# Patient Record
Sex: FEMALE | Race: WHITE | URBAN - METROPOLITAN AREA
[De-identification: names, ages, dates, MRNs, and addresses within clinical notes are randomized per-mention and may not be internally consistent; named-entity substitution may affect disease eponyms.]

---

## 2017-01-10 ENCOUNTER — APPOINTMENT (RX ONLY)
Dept: URBAN - METROPOLITAN AREA CLINIC 348 | Facility: CLINIC | Age: 82
Setting detail: DERMATOLOGY
End: 2017-01-10

## 2017-01-10 DIAGNOSIS — D485 NEOPLASM OF UNCERTAIN BEHAVIOR OF SKIN: ICD-10-CM

## 2017-01-10 PROBLEM — L85.3 XEROSIS CUTIS: Status: ACTIVE | Noted: 2017-01-10

## 2017-01-10 PROBLEM — D48.5 NEOPLASM OF UNCERTAIN BEHAVIOR OF SKIN: Status: ACTIVE | Noted: 2017-01-10

## 2017-01-10 PROCEDURE — 11300 SHAVE SKIN LESION 0.5 CM/<: CPT

## 2017-01-10 PROCEDURE — ? COUNSELING

## 2017-01-10 PROCEDURE — ? SHAVE REMOVAL

## 2017-01-10 ASSESSMENT — LOCATION DETAILED DESCRIPTION DERM: LOCATION DETAILED: RIGHT PROXIMAL DORSAL FOREARM

## 2017-01-10 ASSESSMENT — LOCATION ZONE DERM: LOCATION ZONE: ARM

## 2017-01-10 ASSESSMENT — LOCATION SIMPLE DESCRIPTION DERM: LOCATION SIMPLE: RIGHT FOREARM

## 2017-01-10 NOTE — PROCEDURE: SHAVE REMOVAL
Medical Necessity Clause: This procedure was medically necessary because the lesion that was treated was:
Size Of Margin In Cm (Margins Are Not Added To Billing Dimensions): 0
Wound Care: Aquaphor
Bill 62318 For Specimen Handling/Conveyance To Laboratory?: no
Path Notes (To The Dermatopathologist): Please check margins.
Biopsy Method: Dermablade
Notification Instructions: Pt understands to return to office in 2-4 weeks for biopsy results
Consent was obtained from the patient. The risks and benefits to therapy were discussed in detail. Specifically, the risks of infection, scarring, bleeding, prolonged wound healing, incomplete removal, allergy to anesthesia, nerve injury and recurrence were addressed. Prior to the procedure, the treatment site was clearly identified and confirmed by the patient. All components of Universal Protocol/PAUSE Rule completed.
Detail Level: Detailed
Anesthesia Type: 1% lidocaine without epinephrine
Billing Type: Third-Party Bill
Post-Care Instructions: I reviewed with the patient in detail post-care instructions. Patient is to keep the biopsy site dry overnight, and then apply aquaphor twice daily until healed. Patient may apply hydrogen peroxide soaks to remove any crusting.
Hemostasis: Drysol
Medical Necessity Information: It is in your best interest to select a reason for this procedure from the list below. All of these items fulfill various CMS LCD requirements except the new and changing color options.

## 2017-01-31 ENCOUNTER — APPOINTMENT (RX ONLY)
Dept: URBAN - METROPOLITAN AREA CLINIC 348 | Facility: CLINIC | Age: 82
Setting detail: DERMATOLOGY
End: 2017-01-31

## 2017-01-31 DIAGNOSIS — L57.0 ACTINIC KERATOSIS: ICD-10-CM | Status: RESOLVING

## 2017-01-31 PROBLEM — L85.3 XEROSIS CUTIS: Status: ACTIVE | Noted: 2017-01-31

## 2017-01-31 PROCEDURE — 17000 DESTRUCT PREMALG LESION: CPT

## 2017-01-31 PROCEDURE — ? LIQUID NITROGEN

## 2017-01-31 PROCEDURE — ? COUNSELING

## 2017-01-31 ASSESSMENT — LOCATION ZONE DERM: LOCATION ZONE: ARM

## 2017-01-31 ASSESSMENT — LOCATION DETAILED DESCRIPTION DERM: LOCATION DETAILED: RIGHT PROXIMAL DORSAL FOREARM

## 2017-01-31 ASSESSMENT — LOCATION SIMPLE DESCRIPTION DERM: LOCATION SIMPLE: RIGHT FOREARM

## 2017-04-11 ENCOUNTER — IMPORTED ENCOUNTER (OUTPATIENT)
Dept: URBAN - METROPOLITAN AREA CLINIC 59 | Facility: CLINIC | Age: 82
End: 2017-04-11

## 2017-04-11 PROBLEM — H25.13 BILATERAL NUCLEAR SCLEROSIS CATARACTS: Noted: 2017-04-11

## 2017-04-11 PROBLEM — H43.813 VITREOUS DEGENERATION, BILATERAL: Noted: 2017-04-11

## 2017-04-11 PROBLEM — H35.3132 NONEXUDATIVE AGE-RELATED MACULAR DEGENERATION, BILATERAL, INTERMEDIATE DRY STAGE: Noted: 2017-04-11

## 2017-04-11 PROCEDURE — 92134 CPTRZ OPH DX IMG PST SGM RTA: CPT

## 2017-04-11 PROCEDURE — 92014 COMPRE OPH EXAM EST PT 1/>: CPT

## 2017-04-18 ENCOUNTER — IMPORTED ENCOUNTER (OUTPATIENT)
Dept: URBAN - METROPOLITAN AREA CLINIC 59 | Facility: CLINIC | Age: 82
End: 2017-04-18

## 2017-04-18 PROBLEM — H25.11 NUCLEAR SCLEROSIS CATARACT OF RT EYE: Noted: 2017-04-18

## 2017-04-18 PROBLEM — H35.3132 NONEXUDATIVE AGE-RELATED MACULAR DEGENERATION, BILATERAL, INTERMEDIATE DRY STAGE: Noted: 2017-04-18

## 2017-04-18 PROBLEM — H43.813 VITREOUS DEGENERATION, BILATERAL: Noted: 2017-04-18

## 2017-04-18 PROBLEM — H25.12 NUCLEAR SCLEROSIS CATARACT OF LT EYE: Noted: 2017-04-18

## 2017-04-18 PROCEDURE — 99213 OFFICE O/P EST LOW 20 MIN: CPT

## 2017-04-18 PROCEDURE — 92136 OPHTHALMIC BIOMETRY: CPT

## 2017-04-18 PROCEDURE — KIT COMPUTER ORTHOPTICS KIT

## 2017-04-27 ENCOUNTER — IMPORTED ENCOUNTER (OUTPATIENT)
Dept: URBAN - METROPOLITAN AREA CLINIC 59 | Facility: CLINIC | Age: 82
End: 2017-04-27

## 2017-04-27 PROCEDURE — 66984 XCAPSL CTRC RMVL W/O ECP: CPT

## 2017-04-28 ENCOUNTER — IMPORTED ENCOUNTER (OUTPATIENT)
Dept: URBAN - METROPOLITAN AREA CLINIC 59 | Facility: CLINIC | Age: 82
End: 2017-04-28

## 2017-04-28 PROBLEM — Z96.1 PRESENCE OF PSEUDOPHAKIA: Noted: 2017-04-28

## 2017-04-28 PROCEDURE — 99024 POSTOP FOLLOW-UP VISIT: CPT

## 2017-05-04 ENCOUNTER — IMPORTED ENCOUNTER (OUTPATIENT)
Dept: URBAN - METROPOLITAN AREA CLINIC 59 | Facility: CLINIC | Age: 82
End: 2017-05-04

## 2017-05-04 PROCEDURE — 66984 XCAPSL CTRC RMVL W/O ECP: CPT

## 2017-05-05 ENCOUNTER — IMPORTED ENCOUNTER (OUTPATIENT)
Dept: URBAN - METROPOLITAN AREA CLINIC 59 | Facility: CLINIC | Age: 82
End: 2017-05-05

## 2017-05-05 PROCEDURE — 99024 POSTOP FOLLOW-UP VISIT: CPT

## 2017-05-09 ENCOUNTER — APPOINTMENT (RX ONLY)
Dept: URBAN - METROPOLITAN AREA CLINIC 348 | Facility: CLINIC | Age: 82
Setting detail: DERMATOLOGY
End: 2017-05-09

## 2017-05-09 DIAGNOSIS — D22 MELANOCYTIC NEVI: ICD-10-CM

## 2017-05-09 DIAGNOSIS — I83.9 ASYMPTOMATIC VARICOSE VEINS OF LOWER EXTREMITIES: ICD-10-CM

## 2017-05-09 DIAGNOSIS — Z85.828 PERSONAL HISTORY OF OTHER MALIGNANT NEOPLASM OF SKIN: ICD-10-CM

## 2017-05-09 DIAGNOSIS — D18.0 HEMANGIOMA: ICD-10-CM

## 2017-05-09 DIAGNOSIS — L81.4 OTHER MELANIN HYPERPIGMENTATION: ICD-10-CM

## 2017-05-09 DIAGNOSIS — L82.1 OTHER SEBORRHEIC KERATOSIS: ICD-10-CM

## 2017-05-09 PROBLEM — L55.1 SUNBURN OF SECOND DEGREE: Status: ACTIVE | Noted: 2017-05-09

## 2017-05-09 PROBLEM — D22.5 MELANOCYTIC NEVI OF TRUNK: Status: ACTIVE | Noted: 2017-05-09

## 2017-05-09 PROBLEM — D18.01 HEMANGIOMA OF SKIN AND SUBCUTANEOUS TISSUE: Status: ACTIVE | Noted: 2017-05-09

## 2017-05-09 PROBLEM — I83.93 ASYMPTOMATIC VARICOSE VEINS OF BILATERAL LOWER EXTREMITIES: Status: ACTIVE | Noted: 2017-05-09

## 2017-05-09 PROBLEM — L20.84 INTRINSIC (ALLERGIC) ECZEMA: Status: ACTIVE | Noted: 2017-05-09

## 2017-05-09 PROCEDURE — 99213 OFFICE O/P EST LOW 20 MIN: CPT

## 2017-05-09 PROCEDURE — ? OTHER

## 2017-05-09 PROCEDURE — ? COUNSELING

## 2017-05-09 ASSESSMENT — LOCATION DETAILED DESCRIPTION DERM
LOCATION DETAILED: LEFT SUPERIOR MEDIAL UPPER BACK
LOCATION DETAILED: RIGHT PROXIMAL PRETIBIAL REGION
LOCATION DETAILED: LEFT DISTAL PRETIBIAL REGION
LOCATION DETAILED: EPIGASTRIC SKIN
LOCATION DETAILED: SUPERIOR LUMBAR SPINE
LOCATION DETAILED: LEFT PROXIMAL PRETIBIAL REGION
LOCATION DETAILED: UPPER STERNUM

## 2017-05-09 ASSESSMENT — LOCATION SIMPLE DESCRIPTION DERM
LOCATION SIMPLE: LOWER BACK
LOCATION SIMPLE: LEFT UPPER BACK
LOCATION SIMPLE: ABDOMEN
LOCATION SIMPLE: CHEST
LOCATION SIMPLE: LEFT PRETIBIAL REGION
LOCATION SIMPLE: RIGHT PRETIBIAL REGION

## 2017-05-09 ASSESSMENT — LOCATION ZONE DERM
LOCATION ZONE: LEG
LOCATION ZONE: TRUNK

## 2017-05-09 NOTE — PROCEDURE: OTHER
Note Text (......Xxx Chief Complaint.): This diagnosis correlates with the
Other (Free Text): Patient unsure of type and when treated
Detail Level: Detailed

## 2017-05-16 ENCOUNTER — IMPORTED ENCOUNTER (OUTPATIENT)
Dept: URBAN - METROPOLITAN AREA CLINIC 59 | Facility: CLINIC | Age: 82
End: 2017-05-16

## 2017-05-16 PROBLEM — Z96.1 PRESENCE OF PSEUDOPHAKIA: Noted: 2017-05-16

## 2017-05-16 PROCEDURE — 99024 POSTOP FOLLOW-UP VISIT: CPT

## 2017-05-30 ENCOUNTER — IMPORTED ENCOUNTER (OUTPATIENT)
Dept: URBAN - METROPOLITAN AREA CLINIC 59 | Facility: CLINIC | Age: 82
End: 2017-05-30

## 2017-05-30 PROBLEM — H35.3132 NONEXUDATIVE AGE-RELATED MACULAR DEGENERATION, BILATERAL, INTERMEDIATE DRY STAGE: Noted: 2017-05-30

## 2017-05-30 PROBLEM — Z96.1 PRESENCE OF PSEUDOPHAKIA: Noted: 2017-05-30

## 2017-05-30 PROCEDURE — 99024 POSTOP FOLLOW-UP VISIT: CPT

## 2017-05-30 PROCEDURE — 92134 CPTRZ OPH DX IMG PST SGM RTA: CPT

## 2017-06-05 ENCOUNTER — IMPORTED ENCOUNTER (OUTPATIENT)
Dept: URBAN - METROPOLITAN AREA CLINIC 59 | Facility: CLINIC | Age: 82
End: 2017-06-05

## 2017-06-05 PROBLEM — Z96.1 PRESENCE OF PSEUDOPHAKIA: Noted: 2017-06-05

## 2017-06-05 PROBLEM — H35.81 RETINAL EDEMA: Noted: 2017-06-05

## 2017-06-05 PROBLEM — H43.813 VITREOUS DEGENERATION, BILATERAL: Noted: 2017-06-05

## 2017-06-05 PROBLEM — H35.3133 NONEXUDATIVE AGE-RELATED MACULAR DEGENERATION, BILATERAL, ADVANCED ATROPHIC WITHOUT SUBFOVEAL INVOLVEMENT: Noted: 2017-06-05

## 2017-06-05 PROCEDURE — 92134 CPTRZ OPH DX IMG PST SGM RTA: CPT

## 2017-06-05 PROCEDURE — 92014 COMPRE OPH EXAM EST PT 1/>: CPT

## 2017-06-05 PROCEDURE — 92235 FLUORESCEIN ANGRPH MLTIFRAME: CPT

## 2017-06-27 ENCOUNTER — IMPORTED ENCOUNTER (OUTPATIENT)
Dept: URBAN - METROPOLITAN AREA CLINIC 59 | Facility: CLINIC | Age: 82
End: 2017-06-27

## 2017-06-27 PROBLEM — Z96.1 PRESENCE OF PSEUDOPHAKIA: Noted: 2017-06-27

## 2017-06-27 PROCEDURE — 99024 POSTOP FOLLOW-UP VISIT: CPT

## 2017-07-17 ENCOUNTER — IMPORTED ENCOUNTER (OUTPATIENT)
Dept: URBAN - METROPOLITAN AREA CLINIC 59 | Facility: CLINIC | Age: 82
End: 2017-07-17

## 2017-07-17 PROBLEM — H43.813 VITREOUS DEGENERATION, BILATERAL: Noted: 2017-07-17

## 2017-07-17 PROBLEM — H35.3231 EXUDATIVE AGE-RELATED MACULAR DEGENERATION, BILATERAL, WITH ACTIVE CHOROIDAL NEOVASCULARIZATION: Noted: 2017-07-17

## 2017-07-17 PROCEDURE — 92225 OPHTHALMOSCOPY (INITIAL): CPT

## 2017-07-17 PROCEDURE — 92134 CPTRZ OPH DX IMG PST SGM RTA: CPT

## 2017-07-17 PROCEDURE — 92235 FLUORESCEIN ANGRPH MLTIFRAME: CPT

## 2017-07-17 PROCEDURE — 92004 COMPRE OPH EXAM NEW PT 1/>: CPT

## 2017-07-17 PROCEDURE — 67028 INJECTION EYE DRUG: CPT

## 2017-07-24 ENCOUNTER — IMPORTED ENCOUNTER (OUTPATIENT)
Dept: URBAN - METROPOLITAN AREA CLINIC 59 | Facility: CLINIC | Age: 82
End: 2017-07-24

## 2017-07-24 PROBLEM — H43.813 VITREOUS DEGENERATION, BILATERAL: Noted: 2017-07-24

## 2017-07-24 PROBLEM — H35.81 RETINAL EDEMA: Noted: 2017-07-24

## 2017-07-24 PROBLEM — H35.3231 EXUDATIVE AGE-RELATED MACULAR DEGENERATION, BILATERAL, WITH ACTIVE CHOROIDAL NEOVASCULARIZATION: Noted: 2017-07-24

## 2017-07-24 PROBLEM — Z96.1 PRESENCE OF PSEUDOPHAKIA: Noted: 2017-07-24

## 2017-07-24 PROCEDURE — 67028 INJECTION EYE DRUG: CPT

## 2017-08-23 ENCOUNTER — IMPORTED ENCOUNTER (OUTPATIENT)
Dept: URBAN - METROPOLITAN AREA CLINIC 59 | Facility: CLINIC | Age: 82
End: 2017-08-23

## 2017-08-23 PROBLEM — H43.813 VITREOUS DEGENERATION, BILATERAL: Noted: 2017-08-23

## 2017-08-23 PROBLEM — H35.3231 EXUDATIVE AGE-RELATED MACULAR DEGENERATION, BILATERAL, WITH ACTIVE CHOROIDAL NEOVASCULARIZATION: Noted: 2017-08-23

## 2017-08-23 PROCEDURE — 92134 CPTRZ OPH DX IMG PST SGM RTA: CPT

## 2017-08-23 PROCEDURE — 67028 INJECTION EYE DRUG: CPT

## 2017-09-27 ENCOUNTER — IMPORTED ENCOUNTER (OUTPATIENT)
Dept: URBAN - METROPOLITAN AREA CLINIC 59 | Facility: CLINIC | Age: 82
End: 2017-09-27

## 2017-09-27 PROBLEM — H43.813 VITREOUS DEGENERATION, BILATERAL: Noted: 2017-09-27

## 2017-09-27 PROBLEM — H35.3231 EXUDATIVE AGE-RELATED MACULAR DEGENERATION, BILATERAL, WITH ACTIVE CHOROIDAL NEOVASCULARIZATION: Noted: 2017-09-27

## 2017-09-27 PROCEDURE — 67028 INJECTION EYE DRUG: CPT

## 2017-09-27 PROCEDURE — 92235 FLUORESCEIN ANGRPH MLTIFRAME: CPT

## 2017-09-27 PROCEDURE — 92134 CPTRZ OPH DX IMG PST SGM RTA: CPT

## 2017-11-08 ENCOUNTER — IMPORTED ENCOUNTER (OUTPATIENT)
Dept: URBAN - METROPOLITAN AREA CLINIC 59 | Facility: CLINIC | Age: 82
End: 2017-11-08

## 2017-11-08 PROBLEM — H43.813 VITREOUS DEGENERATION, BILATERAL: Noted: 2017-11-08

## 2017-11-08 PROBLEM — H35.3231 EXUDATIVE AGE-RELATED MACULAR DEGENERATION, BILATERAL, WITH ACTIVE CHOROIDAL NEOVASCULARIZATION: Noted: 2017-11-08

## 2017-11-08 PROCEDURE — 67028 INJECTION EYE DRUG: CPT

## 2017-11-08 PROCEDURE — 92134 CPTRZ OPH DX IMG PST SGM RTA: CPT

## 2017-12-19 ENCOUNTER — IMPORTED ENCOUNTER (OUTPATIENT)
Dept: URBAN - METROPOLITAN AREA CLINIC 59 | Facility: CLINIC | Age: 82
End: 2017-12-19

## 2017-12-19 PROBLEM — H26.492 OTHER SECONDARY CATARACT, LEFT EYE: Noted: 2017-12-19

## 2017-12-19 PROBLEM — H35.3231 EXUDATIVE AGE-RELATED MACULAR DEGENERATION, BILATERAL, WITH ACTIVE CHOROIDAL NEOVASCULARIZATION: Noted: 2017-12-19

## 2017-12-19 PROBLEM — H26.491 PRESENCE OF INTRAOCULAR LENS: Noted: 2017-12-19

## 2017-12-19 PROBLEM — H43.813 VITREOUS DEGENERATION, BILATERAL: Noted: 2017-12-19

## 2017-12-19 PROBLEM — H40.013 OPEN ANGLE WITH BORDERLINE FINDINGS, LOW RISK, BILATERAL: Noted: 2017-12-19

## 2017-12-19 PROCEDURE — 92014 COMPRE OPH EXAM EST PT 1/>: CPT

## 2017-12-27 ENCOUNTER — IMPORTED ENCOUNTER (OUTPATIENT)
Dept: URBAN - METROPOLITAN AREA CLINIC 59 | Facility: CLINIC | Age: 82
End: 2017-12-27

## 2017-12-27 PROBLEM — H43.813 VITREOUS DEGENERATION, BILATERAL: Noted: 2017-12-27

## 2017-12-27 PROBLEM — H35.3231 EXUDATIVE AGE-RELATED MACULAR DEGENERATION, BILATERAL, WITH ACTIVE CHOROIDAL NEOVASCULARIZATION: Noted: 2017-12-27

## 2017-12-27 PROCEDURE — 92134 CPTRZ OPH DX IMG PST SGM RTA: CPT

## 2017-12-27 PROCEDURE — 67028 INJECTION EYE DRUG: CPT

## 2018-04-02 ENCOUNTER — IMPORTED ENCOUNTER (OUTPATIENT)
Dept: URBAN - METROPOLITAN AREA CLINIC 59 | Facility: CLINIC | Age: 83
End: 2018-04-02

## 2018-04-02 PROBLEM — H43.813 VITREOUS DEGENERATION, BILATERAL: Noted: 2018-04-02

## 2018-04-02 PROBLEM — H35.3231 EXUDATIVE AGE-RELATED MACULAR DEGENERATION, BILATERAL, WITH ACTIVE CHOROIDAL NEOVASCULARIZATION: Noted: 2018-04-02

## 2018-04-02 PROCEDURE — 92134 CPTRZ OPH DX IMG PST SGM RTA: CPT

## 2018-04-02 PROCEDURE — 92226 OPHTHALMOSCOPY (SUB): CPT

## 2018-04-02 PROCEDURE — 92014 COMPRE OPH EXAM EST PT 1/>: CPT

## 2018-04-02 PROCEDURE — 67028 INJECTION EYE DRUG: CPT

## 2018-05-02 ENCOUNTER — IMPORTED ENCOUNTER (OUTPATIENT)
Dept: URBAN - METROPOLITAN AREA CLINIC 59 | Facility: CLINIC | Age: 83
End: 2018-05-02

## 2018-05-02 PROBLEM — H43.813 VITREOUS DEGENERATION, BILATERAL: Noted: 2018-05-02

## 2018-05-02 PROBLEM — H35.3231 EXUDATIVE AGE-RELATED MACULAR DEGENERATION, BILATERAL, WITH ACTIVE CHOROIDAL NEOVASCULARIZATION: Noted: 2018-05-02

## 2018-05-02 PROCEDURE — 92235 FLUORESCEIN ANGRPH MLTIFRAME: CPT

## 2018-05-02 PROCEDURE — 92014 COMPRE OPH EXAM EST PT 1/>: CPT

## 2018-05-02 PROCEDURE — 92134 CPTRZ OPH DX IMG PST SGM RTA: CPT

## 2018-05-09 ENCOUNTER — IMPORTED ENCOUNTER (OUTPATIENT)
Dept: URBAN - METROPOLITAN AREA CLINIC 59 | Facility: CLINIC | Age: 83
End: 2018-05-09

## 2018-05-09 PROBLEM — H43.813 VITREOUS DEGENERATION, BILATERAL: Noted: 2018-05-09

## 2018-05-09 PROBLEM — H35.3231 EXUDATIVE AGE-RELATED MACULAR DEGENERATION, BILATERAL, WITH ACTIVE CHOROIDAL NEOVASCULARIZATION: Noted: 2018-05-09

## 2018-05-09 PROCEDURE — 67028 INJECTION EYE DRUG: CPT

## 2018-06-18 ENCOUNTER — IMPORTED ENCOUNTER (OUTPATIENT)
Dept: URBAN - METROPOLITAN AREA CLINIC 59 | Facility: CLINIC | Age: 83
End: 2018-06-18

## 2018-06-18 PROBLEM — H43.813 VITREOUS DEGENERATION, BILATERAL: Noted: 2018-06-18

## 2018-06-18 PROBLEM — H35.3231 EXUDATIVE AGE-RELATED MACULAR DEGENERATION, BILATERAL, WITH ACTIVE CHOROIDAL NEOVASCULARIZATION: Noted: 2018-06-18

## 2018-06-18 PROCEDURE — 92235 FLUORESCEIN ANGRPH MLTIFRAME: CPT

## 2018-06-18 PROCEDURE — 92014 COMPRE OPH EXAM EST PT 1/>: CPT

## 2018-06-18 PROCEDURE — 92250 FUNDUS PHOTOGRAPHY W/I&R: CPT

## 2018-07-16 ENCOUNTER — IMPORTED ENCOUNTER (OUTPATIENT)
Dept: URBAN - METROPOLITAN AREA CLINIC 59 | Facility: CLINIC | Age: 83
End: 2018-07-16

## 2018-07-16 PROBLEM — H43.813 VITREOUS DEGENERATION, BILATERAL: Noted: 2018-07-16

## 2018-07-16 PROBLEM — H35.3231 EXUDATIVE AGE-RELATED MACULAR DEGENERATION, BILATERAL, WITH ACTIVE CHOROIDAL NEOVASCULARIZATION: Noted: 2018-07-16

## 2018-07-16 PROCEDURE — 92134 CPTRZ OPH DX IMG PST SGM RTA: CPT

## 2018-07-16 PROCEDURE — 67028 INJECTION EYE DRUG: CPT

## 2018-07-16 PROCEDURE — 92014 COMPRE OPH EXAM EST PT 1/>: CPT

## 2018-08-22 ENCOUNTER — IMPORTED ENCOUNTER (OUTPATIENT)
Dept: URBAN - METROPOLITAN AREA CLINIC 59 | Facility: CLINIC | Age: 83
End: 2018-08-22

## 2018-08-22 PROBLEM — H35.3231 EXUDATIVE AGE-RELATED MACULAR DEGENERATION, BILATERAL, WITH ACTIVE CHOROIDAL NEOVASCULARIZATION: Noted: 2018-08-22

## 2018-08-22 PROBLEM — H43.813 VITREOUS DEGENERATION, BILATERAL: Noted: 2018-08-22

## 2018-08-22 PROCEDURE — 92014 COMPRE OPH EXAM EST PT 1/>: CPT

## 2018-08-22 PROCEDURE — 92134 CPTRZ OPH DX IMG PST SGM RTA: CPT

## 2018-08-22 PROCEDURE — 67028 INJECTION EYE DRUG: CPT

## 2018-10-03 ENCOUNTER — IMPORTED ENCOUNTER (OUTPATIENT)
Dept: URBAN - METROPOLITAN AREA CLINIC 59 | Facility: CLINIC | Age: 83
End: 2018-10-03

## 2018-10-03 PROBLEM — H43.813 VITREOUS DEGENERATION, BILATERAL: Noted: 2018-10-03

## 2018-10-03 PROBLEM — H35.3231 EXUDATIVE AGE-RELATED MACULAR DEGENERATION, BILATERAL, WITH ACTIVE CHOROIDAL NEOVASCULARIZATION: Noted: 2018-10-03

## 2018-10-03 PROCEDURE — 67028 INJECTION EYE DRUG: CPT

## 2018-10-03 PROCEDURE — 92014 COMPRE OPH EXAM EST PT 1/>: CPT

## 2018-10-03 PROCEDURE — 92134 CPTRZ OPH DX IMG PST SGM RTA: CPT

## 2018-11-06 ENCOUNTER — NEW REFERRAL (OUTPATIENT)
Age: 83
End: 2018-11-06

## 2018-11-06 DIAGNOSIS — H35.3222: ICD-10-CM

## 2018-11-06 DIAGNOSIS — H35.3211: ICD-10-CM

## 2018-11-06 DIAGNOSIS — H43.813: ICD-10-CM

## 2018-11-06 PROCEDURE — 99204 OFFICE O/P NEW MOD 45 MIN: CPT

## 2018-11-06 PROCEDURE — 92134 CPTRZ OPH DX IMG PST SGM RTA: CPT

## 2018-11-06 PROCEDURE — 67028 INJECTION EYE DRUG: CPT

## 2018-11-06 PROCEDURE — 92499 UNLISTED OPH SVC/PROCEDURE: CPT

## 2018-11-06 PROCEDURE — 92250 FUNDUS PHOTOGRAPHY W/I&R: CPT

## 2018-11-06 ASSESSMENT — VISUAL ACUITY
OS_CC: 20/30
OD_CC: 20/30-

## 2018-11-06 ASSESSMENT — TONOMETRY
OD_IOP_MMHG: 12
OS_IOP_MMHG: 12

## 2019-02-01 ENCOUNTER — FOLLOW UP (OUTPATIENT)
Age: 84
End: 2019-02-01

## 2019-02-01 DIAGNOSIS — H43.813: ICD-10-CM

## 2019-02-01 DIAGNOSIS — H35.3211: ICD-10-CM

## 2019-02-01 DIAGNOSIS — H35.3222: ICD-10-CM

## 2019-02-01 PROCEDURE — 92134 CPTRZ OPH DX IMG PST SGM RTA: CPT

## 2019-02-01 PROCEDURE — 67028 INJECTION EYE DRUG: CPT

## 2019-02-01 PROCEDURE — J0178S EYLEA SAMPLE

## 2019-02-01 PROCEDURE — 92014 COMPRE OPH EXAM EST PT 1/>: CPT | Mod: 25

## 2019-02-01 ASSESSMENT — VISUAL ACUITY
OS_SC: 20/60
OD_PH: 20/30-1
OS_PH: 20/30-1
OD_SC: 20/70

## 2019-02-01 ASSESSMENT — TONOMETRY
OS_IOP_MMHG: 11
OD_IOP_MMHG: 11

## 2019-04-05 ENCOUNTER — FOLLOW UP (OUTPATIENT)
Age: 84
End: 2019-04-05

## 2019-04-05 DIAGNOSIS — H43.813: ICD-10-CM

## 2019-04-05 DIAGNOSIS — H35.3222: ICD-10-CM

## 2019-04-05 DIAGNOSIS — H35.3211: ICD-10-CM

## 2019-04-05 PROCEDURE — 92014 COMPRE OPH EXAM EST PT 1/>: CPT | Mod: 25

## 2019-04-05 PROCEDURE — 92134 CPTRZ OPH DX IMG PST SGM RTA: CPT

## 2019-04-05 PROCEDURE — 67028 INJECTION EYE DRUG: CPT

## 2019-04-05 ASSESSMENT — VISUAL ACUITY
OD_CC: 20/20-2
OS_CC: 20/30+2

## 2019-04-05 ASSESSMENT — TONOMETRY
OS_IOP_MMHG: 11
OD_IOP_MMHG: 13

## 2019-06-07 ENCOUNTER — FOLLOW UP (OUTPATIENT)
Age: 84
End: 2019-06-07

## 2019-06-07 DIAGNOSIS — Z96.1: ICD-10-CM

## 2019-06-07 DIAGNOSIS — H35.3222: ICD-10-CM

## 2019-06-07 DIAGNOSIS — H43.813: ICD-10-CM

## 2019-06-07 DIAGNOSIS — H35.3211: ICD-10-CM

## 2019-06-07 PROCEDURE — 67028 INJECTION EYE DRUG: CPT

## 2019-06-07 PROCEDURE — 92014 COMPRE OPH EXAM EST PT 1/>: CPT | Mod: 25

## 2019-06-07 PROCEDURE — 92134 CPTRZ OPH DX IMG PST SGM RTA: CPT

## 2019-06-07 ASSESSMENT — VISUAL ACUITY
OD_CC: 20/20
OS_CC: 20/25+2

## 2019-06-07 ASSESSMENT — TONOMETRY
OD_IOP_MMHG: 10
OS_IOP_MMHG: 11

## 2019-08-16 ENCOUNTER — FOLLOW UP (OUTPATIENT)
Age: 84
End: 2019-08-16

## 2019-08-16 DIAGNOSIS — Z96.1: ICD-10-CM

## 2019-08-16 DIAGNOSIS — H35.3222: ICD-10-CM

## 2019-08-16 DIAGNOSIS — H43.813: ICD-10-CM

## 2019-08-16 DIAGNOSIS — H35.3211: ICD-10-CM

## 2019-08-16 PROCEDURE — 67028 INJECTION EYE DRUG: CPT

## 2019-08-16 PROCEDURE — 92134 CPTRZ OPH DX IMG PST SGM RTA: CPT

## 2019-08-16 PROCEDURE — 92014 COMPRE OPH EXAM EST PT 1/>: CPT | Mod: 25

## 2019-08-16 ASSESSMENT — VISUAL ACUITY
OD_CC: 20/20-1
OS_CC: 20/20-1

## 2019-08-16 ASSESSMENT — TONOMETRY
OS_IOP_MMHG: 21
OD_IOP_MMHG: 13

## 2019-10-29 ENCOUNTER — FOLLOW UP (OUTPATIENT)
Age: 84
End: 2019-10-29

## 2019-10-29 DIAGNOSIS — H43.813: ICD-10-CM

## 2019-10-29 DIAGNOSIS — H35.3222: ICD-10-CM

## 2019-10-29 DIAGNOSIS — H35.3211: ICD-10-CM

## 2019-10-29 PROCEDURE — 92014 COMPRE OPH EXAM EST PT 1/>: CPT | Mod: 25

## 2019-10-29 PROCEDURE — 92134 CPTRZ OPH DX IMG PST SGM RTA: CPT

## 2019-10-29 PROCEDURE — 67028 INJECTION EYE DRUG: CPT

## 2019-10-29 ASSESSMENT — VISUAL ACUITY
OD_CC: 20/20-2
OS_CC: 20/30

## 2019-10-29 ASSESSMENT — TONOMETRY
OS_IOP_MMHG: 11
OD_IOP_MMHG: 11

## 2020-01-10 ENCOUNTER — FOLLOW UP (OUTPATIENT)
Age: 85
End: 2020-01-10

## 2020-01-10 DIAGNOSIS — H43.813: ICD-10-CM

## 2020-01-10 DIAGNOSIS — H35.3211: ICD-10-CM

## 2020-01-10 DIAGNOSIS — H35.3222: ICD-10-CM

## 2020-01-10 PROCEDURE — 67028 INJECTION EYE DRUG: CPT

## 2020-01-10 PROCEDURE — 92014 COMPRE OPH EXAM EST PT 1/>: CPT | Mod: 25

## 2020-01-10 PROCEDURE — 92134 CPTRZ OPH DX IMG PST SGM RTA: CPT

## 2020-01-10 ASSESSMENT — VISUAL ACUITY
OD_PH: 20/30-2
OD_SC: 20/50-2
OS_SC: 20/70-2
OS_PH: 20/50-2

## 2020-01-10 ASSESSMENT — TONOMETRY
OD_IOP_MMHG: 12
OS_IOP_MMHG: 12

## 2020-04-03 ENCOUNTER — FOLLOW UP (OUTPATIENT)
Age: 85
End: 2020-04-03

## 2020-04-03 DIAGNOSIS — H35.3222: ICD-10-CM

## 2020-04-03 DIAGNOSIS — H43.813: ICD-10-CM

## 2020-04-03 DIAGNOSIS — H35.3211: ICD-10-CM

## 2020-04-03 PROCEDURE — 67028 INJECTION EYE DRUG: CPT

## 2020-04-03 PROCEDURE — 92014 COMPRE OPH EXAM EST PT 1/>: CPT | Mod: 25

## 2020-04-03 PROCEDURE — 92134 CPTRZ OPH DX IMG PST SGM RTA: CPT

## 2020-04-03 ASSESSMENT — VISUAL ACUITY
OD_CC: 20/60
OS_CC: 20/50-

## 2020-04-03 ASSESSMENT — TONOMETRY
OD_IOP_MMHG: 16
OS_IOP_MMHG: 10

## 2020-06-26 ENCOUNTER — FOLLOW UP (OUTPATIENT)
Age: 85
End: 2020-06-26

## 2020-06-26 DIAGNOSIS — H43.813: ICD-10-CM

## 2020-06-26 DIAGNOSIS — H35.3211: ICD-10-CM

## 2020-06-26 DIAGNOSIS — H35.3221: ICD-10-CM

## 2020-06-26 PROCEDURE — 67028 INJECTION EYE DRUG: CPT

## 2020-06-26 PROCEDURE — 92014 COMPRE OPH EXAM EST PT 1/>: CPT | Mod: 25

## 2020-06-26 PROCEDURE — 92134 CPTRZ OPH DX IMG PST SGM RTA: CPT

## 2020-06-26 ASSESSMENT — VISUAL ACUITY
OD_CC: 20/25
OS_CC: 20/30

## 2020-06-26 ASSESSMENT — TONOMETRY
OD_IOP_MMHG: 15
OS_IOP_MMHG: 12

## 2020-07-24 ENCOUNTER — FOLLOW UP (OUTPATIENT)
Age: 85
End: 2020-07-24

## 2020-07-24 DIAGNOSIS — Z96.1: ICD-10-CM

## 2020-07-24 DIAGNOSIS — H43.813: ICD-10-CM

## 2020-07-24 DIAGNOSIS — H35.3211: ICD-10-CM

## 2020-07-24 DIAGNOSIS — H35.3221: ICD-10-CM

## 2020-07-24 PROCEDURE — 92012 INTRM OPH EXAM EST PATIENT: CPT | Mod: 25

## 2020-07-24 PROCEDURE — 67028 INJECTION EYE DRUG: CPT

## 2020-07-24 PROCEDURE — 92134 CPTRZ OPH DX IMG PST SGM RTA: CPT

## 2020-07-24 ASSESSMENT — TONOMETRY
OS_IOP_MMHG: 10
OD_IOP_MMHG: 12

## 2020-07-24 ASSESSMENT — VISUAL ACUITY
OD_CC: 20/25+3
OS_CC: 20/30+2

## 2020-09-11 ENCOUNTER — FOLLOW UP (OUTPATIENT)
Age: 85
End: 2020-09-11

## 2020-09-11 DIAGNOSIS — H43.813: ICD-10-CM

## 2020-09-11 DIAGNOSIS — H35.3211: ICD-10-CM

## 2020-09-11 DIAGNOSIS — H35.3221: ICD-10-CM

## 2020-09-11 PROCEDURE — 92014 COMPRE OPH EXAM EST PT 1/>: CPT | Mod: 25

## 2020-09-11 PROCEDURE — 92134 CPTRZ OPH DX IMG PST SGM RTA: CPT

## 2020-09-11 PROCEDURE — 67028 INJECTION EYE DRUG: CPT

## 2020-09-11 ASSESSMENT — TONOMETRY
OD_IOP_MMHG: 14
OS_IOP_MMHG: 14

## 2020-09-11 ASSESSMENT — VISUAL ACUITY
OS_CC: 20/40-1
OD_CC: 20/25-1

## 2020-10-09 ENCOUNTER — FOLLOW UP (OUTPATIENT)
Age: 85
End: 2020-10-09

## 2020-10-09 DIAGNOSIS — H35.3221: ICD-10-CM

## 2020-10-09 DIAGNOSIS — H43.813: ICD-10-CM

## 2020-10-09 DIAGNOSIS — H35.3211: ICD-10-CM

## 2020-10-09 PROCEDURE — 92134 CPTRZ OPH DX IMG PST SGM RTA: CPT

## 2020-10-09 PROCEDURE — 67028 INJECTION EYE DRUG: CPT | Mod: 50

## 2020-10-09 PROCEDURE — 92014 COMPRE OPH EXAM EST PT 1/>: CPT | Mod: 25

## 2020-10-09 ASSESSMENT — VISUAL ACUITY
OD_CC: 20/30
OS_CC: 20/40

## 2020-10-09 ASSESSMENT — TONOMETRY
OD_IOP_MMHG: 17
OS_IOP_MMHG: 14

## 2020-11-10 ENCOUNTER — FOLLOW UP (OUTPATIENT)
Age: 85
End: 2020-11-10

## 2020-11-10 DIAGNOSIS — H43.813: ICD-10-CM

## 2020-11-10 DIAGNOSIS — H35.3211: ICD-10-CM

## 2020-11-10 DIAGNOSIS — H35.3221: ICD-10-CM

## 2020-11-10 PROCEDURE — 92012 INTRM OPH EXAM EST PATIENT: CPT | Mod: 25

## 2020-11-10 PROCEDURE — 67028 INJECTION EYE DRUG: CPT | Mod: 50

## 2020-11-10 PROCEDURE — 92134 CPTRZ OPH DX IMG PST SGM RTA: CPT

## 2020-11-10 ASSESSMENT — VISUAL ACUITY
OD_CC: 20/25-2
OS_CC: 20/40

## 2020-11-10 ASSESSMENT — TONOMETRY
OS_IOP_MMHG: 16
OD_IOP_MMHG: 15

## 2020-12-22 ENCOUNTER — FOLLOW UP (OUTPATIENT)
Age: 85
End: 2020-12-22

## 2020-12-22 DIAGNOSIS — H35.3211: ICD-10-CM

## 2020-12-22 DIAGNOSIS — H35.3221: ICD-10-CM

## 2020-12-22 DIAGNOSIS — H43.813: ICD-10-CM

## 2020-12-22 PROCEDURE — 92134 CPTRZ OPH DX IMG PST SGM RTA: CPT

## 2020-12-22 PROCEDURE — 92014 COMPRE OPH EXAM EST PT 1/>: CPT | Mod: 25

## 2020-12-22 PROCEDURE — 67028 INJECTION EYE DRUG: CPT | Mod: 50

## 2020-12-22 ASSESSMENT — VISUAL ACUITY
OS_CC: 20/30
OD_CC: 20/25

## 2020-12-22 ASSESSMENT — TONOMETRY
OS_IOP_MMHG: 18
OD_IOP_MMHG: 17

## 2021-02-09 ENCOUNTER — FOLLOW UP (OUTPATIENT)
Age: 86
End: 2021-02-09

## 2021-02-09 DIAGNOSIS — H35.3221: ICD-10-CM

## 2021-02-09 DIAGNOSIS — H43.813: ICD-10-CM

## 2021-02-09 DIAGNOSIS — H35.3211: ICD-10-CM

## 2021-02-09 PROCEDURE — 67028 INJECTION EYE DRUG: CPT | Mod: 50

## 2021-02-09 PROCEDURE — 92134 CPTRZ OPH DX IMG PST SGM RTA: CPT

## 2021-02-09 PROCEDURE — 99214 OFFICE O/P EST MOD 30 MIN: CPT | Mod: 25

## 2021-02-09 ASSESSMENT — TONOMETRY
OS_IOP_MMHG: 13
OD_IOP_MMHG: 15

## 2021-02-09 ASSESSMENT — VISUAL ACUITY
OD_CC: 20/20-2
OS_CC: 20/30-3

## 2021-04-06 ENCOUNTER — FOLLOW UP (OUTPATIENT)
Age: 86
End: 2021-04-06

## 2021-04-06 DIAGNOSIS — H43.813: ICD-10-CM

## 2021-04-06 DIAGNOSIS — H35.3211: ICD-10-CM

## 2021-04-06 DIAGNOSIS — H35.3221: ICD-10-CM

## 2021-04-06 PROCEDURE — 92014 COMPRE OPH EXAM EST PT 1/>: CPT | Mod: 25

## 2021-04-06 PROCEDURE — 92134 CPTRZ OPH DX IMG PST SGM RTA: CPT

## 2021-04-06 PROCEDURE — 67028 INJECTION EYE DRUG: CPT | Mod: 50

## 2021-04-06 ASSESSMENT — VISUAL ACUITY
OS_CC: 20/40-2
OS_PH: 20/30-2
OD_CC: 20/20

## 2021-04-06 ASSESSMENT — TONOMETRY
OS_IOP_MMHG: 13
OD_IOP_MMHG: 14

## 2021-06-08 ENCOUNTER — FOLLOW UP (OUTPATIENT)
Dept: URBAN - METROPOLITAN AREA CLINIC 78 | Facility: CLINIC | Age: 86
End: 2021-06-08

## 2021-06-08 DIAGNOSIS — H35.3231: ICD-10-CM

## 2021-06-08 DIAGNOSIS — H43.813: ICD-10-CM

## 2021-06-08 PROCEDURE — 99214 OFFICE O/P EST MOD 30 MIN: CPT | Mod: 25

## 2021-06-08 PROCEDURE — PFS EYLEA PFS

## 2021-06-08 PROCEDURE — 67028 INJECTION EYE DRUG: CPT | Mod: 50

## 2021-06-08 PROCEDURE — 92134 CPTRZ OPH DX IMG PST SGM RTA: CPT

## 2021-06-08 ASSESSMENT — TONOMETRY
OD_IOP_MMHG: 13
OS_IOP_MMHG: 08

## 2021-06-08 ASSESSMENT — VISUAL ACUITY
OD_CC: 20/20-
OS_CC: 20/60

## 2021-08-17 ENCOUNTER — FOLLOW UP (OUTPATIENT)
Dept: URBAN - METROPOLITAN AREA CLINIC 78 | Facility: CLINIC | Age: 86
End: 2021-08-17

## 2021-08-17 DIAGNOSIS — H35.3231: ICD-10-CM

## 2021-08-17 DIAGNOSIS — H43.813: ICD-10-CM

## 2021-08-17 PROCEDURE — 92014 COMPRE OPH EXAM EST PT 1/>: CPT | Mod: 25

## 2021-08-17 PROCEDURE — 92134 CPTRZ OPH DX IMG PST SGM RTA: CPT

## 2021-08-17 PROCEDURE — 67028 INJECTION EYE DRUG: CPT | Mod: 50

## 2021-08-17 PROCEDURE — PFS EYLEA PFS

## 2021-08-17 ASSESSMENT — TONOMETRY
OS_IOP_MMHG: 9
OD_IOP_MMHG: 11

## 2021-08-17 ASSESSMENT — VISUAL ACUITY
OD_CC: 20/20-
OS_CC: 20/30+2

## 2021-10-12 ENCOUNTER — UNSCHEDULED FOLLOW UP (OUTPATIENT)
Dept: URBAN - METROPOLITAN AREA CLINIC 78 | Facility: CLINIC | Age: 86
End: 2021-10-12

## 2021-10-12 DIAGNOSIS — H53.8: ICD-10-CM

## 2021-10-12 DIAGNOSIS — H35.3231: ICD-10-CM

## 2021-10-12 DIAGNOSIS — H43.813: ICD-10-CM

## 2021-10-12 PROCEDURE — 92134 CPTRZ OPH DX IMG PST SGM RTA: CPT

## 2021-10-12 PROCEDURE — 92014 COMPRE OPH EXAM EST PT 1/>: CPT

## 2021-10-12 ASSESSMENT — VISUAL ACUITY
OS_CC: 20/25-2
OD_CC: 20/20-2

## 2021-10-12 ASSESSMENT — TONOMETRY
OS_IOP_MMHG: 10
OD_IOP_MMHG: 11

## 2021-11-09 ENCOUNTER — FOLLOW UP (OUTPATIENT)
Dept: URBAN - METROPOLITAN AREA CLINIC 78 | Facility: CLINIC | Age: 86
End: 2021-11-09

## 2021-11-09 DIAGNOSIS — H43.813: ICD-10-CM

## 2021-11-09 DIAGNOSIS — H35.3231: ICD-10-CM

## 2021-11-09 DIAGNOSIS — Z96.1: ICD-10-CM

## 2021-11-09 PROCEDURE — 67028 INJECTION EYE DRUG: CPT | Mod: 50

## 2021-11-09 PROCEDURE — 99213 OFFICE O/P EST LOW 20 MIN: CPT | Mod: 25

## 2021-11-09 PROCEDURE — PFS EYLEA PFS

## 2021-11-09 PROCEDURE — 92134 CPTRZ OPH DX IMG PST SGM RTA: CPT

## 2021-11-09 ASSESSMENT — VISUAL ACUITY
OD_CC: 20/25-3
OS_CC: 20/30-1

## 2021-11-09 ASSESSMENT — TONOMETRY
OS_IOP_MMHG: 10
OD_IOP_MMHG: 13

## 2022-02-01 ENCOUNTER — FOLLOW UP (OUTPATIENT)
Dept: URBAN - METROPOLITAN AREA CLINIC 78 | Facility: CLINIC | Age: 87
End: 2022-02-01

## 2022-02-01 DIAGNOSIS — H35.3231: ICD-10-CM

## 2022-02-01 DIAGNOSIS — H43.813: ICD-10-CM

## 2022-02-01 DIAGNOSIS — Z96.1: ICD-10-CM

## 2022-02-01 PROCEDURE — PFS EYLEA PFS

## 2022-02-01 PROCEDURE — 92134 CPTRZ OPH DX IMG PST SGM RTA: CPT

## 2022-02-01 PROCEDURE — 92014 COMPRE OPH EXAM EST PT 1/>: CPT | Mod: 25

## 2022-02-01 PROCEDURE — 67028 INJECTION EYE DRUG: CPT | Mod: 50

## 2022-02-01 ASSESSMENT — VISUAL ACUITY
OD_CC: 20/20
OS_CC: 20/25

## 2022-02-01 ASSESSMENT — TONOMETRY
OS_IOP_MMHG: 8
OD_IOP_MMHG: 10

## 2022-04-26 ENCOUNTER — FOLLOW UP (OUTPATIENT)
Dept: URBAN - METROPOLITAN AREA CLINIC 78 | Facility: CLINIC | Age: 87
End: 2022-04-26

## 2022-04-26 DIAGNOSIS — H43.813: ICD-10-CM

## 2022-04-26 DIAGNOSIS — H35.3231: ICD-10-CM

## 2022-04-26 PROCEDURE — 92014 COMPRE OPH EXAM EST PT 1/>: CPT | Mod: 25

## 2022-04-26 PROCEDURE — 67028 INJECTION EYE DRUG: CPT | Mod: 50

## 2022-04-26 PROCEDURE — 92134 CPTRZ OPH DX IMG PST SGM RTA: CPT

## 2022-04-26 PROCEDURE — PFS EYLEA PFS

## 2022-04-26 ASSESSMENT — TONOMETRY
OD_IOP_MMHG: 13
OS_IOP_MMHG: 12

## 2022-04-26 ASSESSMENT — VISUAL ACUITY
OS_CC: 20/30
OD_CC: 20/25-1

## 2022-07-19 ENCOUNTER — FOLLOW UP (OUTPATIENT)
Dept: URBAN - METROPOLITAN AREA CLINIC 78 | Facility: CLINIC | Age: 87
End: 2022-07-19

## 2022-07-19 DIAGNOSIS — H35.3231: ICD-10-CM

## 2022-07-19 DIAGNOSIS — H43.813: ICD-10-CM

## 2022-07-19 PROCEDURE — PFS EYLEA PFS

## 2022-07-19 PROCEDURE — 67028 INJECTION EYE DRUG: CPT | Mod: 50

## 2022-07-19 PROCEDURE — 92014 COMPRE OPH EXAM EST PT 1/>: CPT | Mod: 25

## 2022-07-19 PROCEDURE — 92134 CPTRZ OPH DX IMG PST SGM RTA: CPT

## 2022-07-19 ASSESSMENT — TONOMETRY
OS_IOP_MMHG: 13
OD_IOP_MMHG: 13

## 2022-07-19 ASSESSMENT — VISUAL ACUITY
OD_PH: 20/30-2
OS_PH: 20/40
OS_SC: 20/50-1
OD_SC: 20/60-1

## 2022-10-18 ENCOUNTER — FOLLOW UP (OUTPATIENT)
Dept: URBAN - METROPOLITAN AREA CLINIC 78 | Facility: CLINIC | Age: 87
End: 2022-10-18

## 2022-10-18 DIAGNOSIS — H43.813: ICD-10-CM

## 2022-10-18 DIAGNOSIS — H35.3231: ICD-10-CM

## 2022-10-18 DIAGNOSIS — Z96.1: ICD-10-CM

## 2022-10-18 PROCEDURE — 67028 INJECTION EYE DRUG: CPT | Mod: 50

## 2022-10-18 PROCEDURE — PFS EYLEA PFS

## 2022-10-18 PROCEDURE — 92134 CPTRZ OPH DX IMG PST SGM RTA: CPT

## 2022-10-18 PROCEDURE — 92014 COMPRE OPH EXAM EST PT 1/>: CPT | Mod: 25

## 2022-10-18 ASSESSMENT — VISUAL ACUITY
OS_CC: 20/50-1
OS_PH: 20/40
OD_CC: 20/25

## 2022-10-18 ASSESSMENT — TONOMETRY
OD_IOP_MMHG: 07
OS_IOP_MMHG: 09

## 2023-01-24 ENCOUNTER — FOLLOW UP (OUTPATIENT)
Dept: URBAN - METROPOLITAN AREA CLINIC 78 | Facility: CLINIC | Age: 88
End: 2023-01-24

## 2023-01-24 DIAGNOSIS — H43.813: ICD-10-CM

## 2023-01-24 DIAGNOSIS — H35.3231: ICD-10-CM

## 2023-01-24 DIAGNOSIS — Z96.1: ICD-10-CM

## 2023-01-24 PROCEDURE — PFS EYLEA PFS

## 2023-01-24 PROCEDURE — 92014 COMPRE OPH EXAM EST PT 1/>: CPT | Mod: 25

## 2023-01-24 PROCEDURE — 67028 INJECTION EYE DRUG: CPT | Mod: 50

## 2023-01-24 PROCEDURE — 92134 CPTRZ OPH DX IMG PST SGM RTA: CPT

## 2023-01-24 ASSESSMENT — VISUAL ACUITY
OS_PH: 20/40+1
OS_CC: 20/50-2
OD_CC: 20/20-2

## 2023-01-24 ASSESSMENT — TONOMETRY
OS_IOP_MMHG: 13
OD_IOP_MMHG: 15

## 2023-05-02 ENCOUNTER — FOLLOW UP (OUTPATIENT)
Dept: URBAN - METROPOLITAN AREA CLINIC 78 | Facility: CLINIC | Age: 88
End: 2023-05-02

## 2023-05-02 DIAGNOSIS — H43.813: ICD-10-CM

## 2023-05-02 DIAGNOSIS — H35.3231: ICD-10-CM

## 2023-05-02 PROCEDURE — 67028 INJECTION EYE DRUG: CPT | Mod: 50

## 2023-05-02 PROCEDURE — 92134 CPTRZ OPH DX IMG PST SGM RTA: CPT

## 2023-05-02 PROCEDURE — PFS EYLEA PFS

## 2023-05-02 PROCEDURE — 92014 COMPRE OPH EXAM EST PT 1/>: CPT | Mod: 25

## 2023-05-02 ASSESSMENT — VISUAL ACUITY
OS_CC: 20/50+
OD_CC: 20/25+

## 2023-05-02 ASSESSMENT — TONOMETRY
OD_IOP_MMHG: 10
OS_IOP_MMHG: 9

## 2023-07-14 ENCOUNTER — APPOINTMENT (RX ONLY)
Dept: URBAN - METROPOLITAN AREA CLINIC 341 | Facility: CLINIC | Age: 88
Setting detail: DERMATOLOGY
End: 2023-07-14

## 2023-07-14 DIAGNOSIS — D18.0 HEMANGIOMA: ICD-10-CM

## 2023-07-14 DIAGNOSIS — L21.8 OTHER SEBORRHEIC DERMATITIS: ICD-10-CM | Status: INADEQUATELY CONTROLLED

## 2023-07-14 DIAGNOSIS — D22 MELANOCYTIC NEVI: ICD-10-CM

## 2023-07-14 DIAGNOSIS — L81.4 OTHER MELANIN HYPERPIGMENTATION: ICD-10-CM

## 2023-07-14 DIAGNOSIS — L82.1 OTHER SEBORRHEIC KERATOSIS: ICD-10-CM

## 2023-07-14 DIAGNOSIS — Z85.828 PERSONAL HISTORY OF OTHER MALIGNANT NEOPLASM OF SKIN: ICD-10-CM | Status: RESOLVED

## 2023-07-14 PROBLEM — D18.01 HEMANGIOMA OF SKIN AND SUBCUTANEOUS TISSUE: Status: ACTIVE | Noted: 2023-07-14

## 2023-07-14 PROBLEM — D22.5 MELANOCYTIC NEVI OF TRUNK: Status: ACTIVE | Noted: 2023-07-14

## 2023-07-14 PROBLEM — D22.4 MELANOCYTIC NEVI OF SCALP AND NECK: Status: ACTIVE | Noted: 2023-07-14

## 2023-07-14 PROCEDURE — ? PRESCRIPTION

## 2023-07-14 PROCEDURE — ? OTHER

## 2023-07-14 PROCEDURE — ? TREATMENT REGIMEN

## 2023-07-14 PROCEDURE — ? COUNSELING

## 2023-07-14 PROCEDURE — ? PRESCRIPTION MEDICATION MANAGEMENT

## 2023-07-14 PROCEDURE — 99204 OFFICE O/P NEW MOD 45 MIN: CPT

## 2023-07-14 RX ORDER — CLOBETASOL PROPIONATE 0.5 MG/ML
SOLUTION TOPICAL
Qty: 50 | Refills: 4 | Status: ERX | COMMUNITY
Start: 2023-07-14

## 2023-07-14 RX ADMIN — CLOBETASOL PROPIONATE: 0.5 SOLUTION TOPICAL at 00:00

## 2023-07-14 ASSESSMENT — LOCATION DETAILED DESCRIPTION DERM
LOCATION DETAILED: LEFT INFERIOR FRONTAL SCALP
LOCATION DETAILED: LEFT DISTAL PRETIBIAL REGION
LOCATION DETAILED: RIGHT INFERIOR LATERAL MIDBACK
LOCATION DETAILED: RIGHT INFERIOR UPPER BACK
LOCATION DETAILED: RIGHT SUPERIOR PARIETAL SCALP
LOCATION DETAILED: RIGHT MID-UPPER BACK

## 2023-07-14 ASSESSMENT — LOCATION ZONE DERM
LOCATION ZONE: SCALP
LOCATION ZONE: LEG
LOCATION ZONE: TRUNK

## 2023-07-14 ASSESSMENT — LOCATION SIMPLE DESCRIPTION DERM
LOCATION SIMPLE: SCALP
LOCATION SIMPLE: RIGHT LOWER BACK
LOCATION SIMPLE: RIGHT UPPER BACK
LOCATION SIMPLE: LEFT PRETIBIAL REGION

## 2023-07-14 NOTE — PROCEDURE: PRESCRIPTION MEDICATION MANAGEMENT
Detail Level: Zone
Initiate Treatment: clobetasol 0.05 % scalp solution - Apply to affected areas of scalp twice daily x 2 weeks then as needed for itching
Render In Strict Bullet Format?: No

## 2023-07-25 ENCOUNTER — FOLLOW UP (OUTPATIENT)
Dept: URBAN - METROPOLITAN AREA CLINIC 78 | Facility: CLINIC | Age: 88
End: 2023-07-25

## 2023-07-25 DIAGNOSIS — Z96.1: ICD-10-CM

## 2023-07-25 DIAGNOSIS — H43.813: ICD-10-CM

## 2023-07-25 DIAGNOSIS — H35.3231: ICD-10-CM

## 2023-07-25 PROCEDURE — 67028 INJECTION EYE DRUG: CPT | Mod: 50

## 2023-07-25 PROCEDURE — 92134 CPTRZ OPH DX IMG PST SGM RTA: CPT

## 2023-07-25 PROCEDURE — 92014 COMPRE OPH EXAM EST PT 1/>: CPT | Mod: 25

## 2023-07-25 PROCEDURE — PFS EYLEA PFS: Mod: JZ

## 2023-07-25 ASSESSMENT — VISUAL ACUITY
OS_PH: 20/30-1
OS_CC: 20/40+2
OD_CC: 20/25-2

## 2023-07-25 ASSESSMENT — TONOMETRY
OD_IOP_MMHG: 20
OS_IOP_MMHG: 23

## 2023-10-15 ASSESSMENT — VISUAL ACUITY
OD_CC: 20/25+2
OS_SC: 20/40-2
OD_SC: 20/50
OS_CC: 20/30
OD_CC: 20/30+2
OS_CC: 20/25+1
OS_SC: 20/25
OS_SC: 20/50
OS_CC: 20/25-2
OS_CC: 20/25-2
OD_CC: 20/25-2
OS_BAT: 20/80
OD_CC: 20/20-1
OD_SC: 20/50
OS_CC: 20/25-2
OS_CC: 20/30-1
OS_CC: 20/40-1
OD_BAT: 20/80
OD_SC: 20/80-2
OS_SC: 20/50
OD_CC: 20/100
OD_CC: 20/20-1
OD_CC: 20/20
OD_CC: 20/40
OS_CC: 20/20-2
OD_CC: 20/25-2
OS_CC: 20/20-1
OD_SC: 20/150
OS_SC: 20/50
OD_CC: 20/25+2
OD_CC: 20/20-2
OD_SC: 20/100+1
OD_SC: 20/50
OD_SC: 20/70
OD_CC: 20/30
OS_SC: 20/20-2
OD_CC: 20/25+1
OS_CC: 20/40
OD_SC: 20/100+1
OS_CC: 20/25+1
OS_CC: 20/25-2
OS_SC: 20/50
OS_SC: 20/40-1
OD_CC: 20/40+1
OS_CC: 20/40
OS_CC: 20/40

## 2023-10-15 ASSESSMENT — TONOMETRY
OD_IOP_MMHG: 11
OS_IOP_MMHG: 10
OS_IOP_MMHG: 10
OD_IOP_MMHG: 17
OS_IOP_MMHG: 11
OS_IOP_MMHG: 11
OD_IOP_MMHG: 9
OS_IOP_MMHG: 10
OS_IOP_MMHG: 12
OS_IOP_MMHG: 15
OD_IOP_MMHG: 13
OS_IOP_MMHG: 8
OD_IOP_MMHG: 16
OS_IOP_MMHG: 16
OD_IOP_MMHG: 10
OS_IOP_MMHG: 10
OD_IOP_MMHG: 9
OD_IOP_MMHG: 8
OS_IOP_MMHG: 10
OD_IOP_MMHG: 16
OD_IOP_MMHG: 12
OD_IOP_MMHG: 8
OS_IOP_MMHG: 17
OD_IOP_MMHG: 10
OS_IOP_MMHG: 16
OD_IOP_MMHG: 13
OD_IOP_MMHG: 7
OD_IOP_MMHG: 8
OS_IOP_MMHG: 12
OD_IOP_MMHG: 14
OS_IOP_MMHG: 10
OS_IOP_MMHG: 13
OS_IOP_MMHG: 9
OD_IOP_MMHG: 16
OD_IOP_MMHG: 10
OD_IOP_MMHG: 10
OS_IOP_MMHG: 9

## 2023-10-15 ASSESSMENT — KERATOMETRY
OD_K2POWER_DIOPTERS: 44.00
OD_AXISANGLE_DEGREES: 152
OS_K2POWER_DIOPTERS: 44.00
OD_AXISANGLE2_DEGREES: 62
OD_K1POWER_DIOPTERS: 43.75
OS_AXISANGLE_DEGREES: 0
OS_K1POWER_DIOPTERS: 44.00
OS_AXISANGLE2_DEGREES: 90

## 2023-10-15 ASSESSMENT — PACHYMETRY
OD_CT_UM: C:  FINAL: 0.000; P:
OD_CT_UM: C:  FINAL: 0.000; P:
OS_CT_UM: C:  FINAL: 0.000; P:
OD_CT_UM: C:  FINAL: 0.000; P:
OS_CT_UM: C:  FINAL: 0.000; P:
OS_CT_UM: C:  FINAL: 0.000; P:

## 2023-10-17 ENCOUNTER — FOLLOW UP (OUTPATIENT)
Dept: URBAN - METROPOLITAN AREA CLINIC 78 | Facility: CLINIC | Age: 88
End: 2023-10-17

## 2023-10-17 DIAGNOSIS — H43.813: ICD-10-CM

## 2023-10-17 DIAGNOSIS — H35.3231: ICD-10-CM

## 2023-10-17 PROCEDURE — 67028 INJECTION EYE DRUG: CPT | Mod: 50

## 2023-10-17 PROCEDURE — PFS EYLEA PFS: Mod: JZ

## 2023-10-17 PROCEDURE — 92134 CPTRZ OPH DX IMG PST SGM RTA: CPT

## 2023-10-17 PROCEDURE — 92014 COMPRE OPH EXAM EST PT 1/>: CPT | Mod: 25

## 2023-10-17 ASSESSMENT — VISUAL ACUITY
OD_CC: 20/20-3
OS_CC: 20/40

## 2023-10-17 ASSESSMENT — TONOMETRY
OD_IOP_MMHG: 16
OS_IOP_MMHG: 18

## 2024-01-23 ENCOUNTER — FOLLOW UP (OUTPATIENT)
Dept: URBAN - METROPOLITAN AREA CLINIC 78 | Facility: CLINIC | Age: 89
End: 2024-01-23

## 2024-01-23 DIAGNOSIS — H35.3231: ICD-10-CM

## 2024-01-23 DIAGNOSIS — H43.813: ICD-10-CM

## 2024-01-23 PROCEDURE — 92014 COMPRE OPH EXAM EST PT 1/>: CPT | Mod: 25

## 2024-01-23 PROCEDURE — PFS EYLEA PFS: Mod: JZ

## 2024-01-23 PROCEDURE — 67028 INJECTION EYE DRUG: CPT | Mod: 50

## 2024-01-23 PROCEDURE — 92134 CPTRZ OPH DX IMG PST SGM RTA: CPT

## 2024-01-23 ASSESSMENT — TONOMETRY
OS_IOP_MMHG: 14
OD_IOP_MMHG: 14

## 2024-01-23 ASSESSMENT — VISUAL ACUITY
OS_CC: 20/40-1
OD_CC: 20/20-2

## 2024-04-23 ENCOUNTER — FOLLOW UP (OUTPATIENT)
Dept: URBAN - METROPOLITAN AREA CLINIC 78 | Facility: CLINIC | Age: 89
End: 2024-04-23

## 2024-04-23 DIAGNOSIS — H35.3231: ICD-10-CM

## 2024-04-23 DIAGNOSIS — H43.813: ICD-10-CM

## 2024-04-23 PROCEDURE — 92134 CPTRZ OPH DX IMG PST SGM RTA: CPT

## 2024-04-23 PROCEDURE — 92014 COMPRE OPH EXAM EST PT 1/>: CPT

## 2024-04-23 ASSESSMENT — TONOMETRY
OS_IOP_MMHG: 14
OD_IOP_MMHG: 10

## 2024-04-23 ASSESSMENT — VISUAL ACUITY
OS_PH: 20/40
OS_CC: 20/50+2
OD_CC: 20/25-1

## 2024-06-04 ENCOUNTER — FOLLOW UP (OUTPATIENT)
Dept: URBAN - METROPOLITAN AREA CLINIC 78 | Facility: CLINIC | Age: 89
End: 2024-06-04

## 2024-06-04 DIAGNOSIS — H35.3231: ICD-10-CM

## 2024-06-04 DIAGNOSIS — H43.813: ICD-10-CM

## 2024-06-04 PROCEDURE — 92134 CPTRZ OPH DX IMG PST SGM RTA: CPT | Mod: NC

## 2024-06-04 PROCEDURE — 99213 OFFICE O/P EST LOW 20 MIN: CPT

## 2024-06-04 PROCEDURE — 92250 FUNDUS PHOTOGRAPHY W/I&R: CPT

## 2024-06-04 ASSESSMENT — VISUAL ACUITY
OS_CC: 20/40-1
OD_CC: 20/30+2

## 2024-06-04 ASSESSMENT — TONOMETRY
OD_IOP_MMHG: 16
OS_IOP_MMHG: 13